# Patient Record
Sex: FEMALE | Race: BLACK OR AFRICAN AMERICAN | NOT HISPANIC OR LATINO | URBAN - NONMETROPOLITAN AREA
[De-identification: names, ages, dates, MRNs, and addresses within clinical notes are randomized per-mention and may not be internally consistent; named-entity substitution may affect disease eponyms.]

---

## 2019-03-01 ENCOUNTER — IMPORTED ENCOUNTER (OUTPATIENT)
Dept: URBAN - NONMETROPOLITAN AREA CLINIC 1 | Facility: CLINIC | Age: 59
End: 2019-03-01

## 2019-03-01 PROBLEM — H40.013: Noted: 2019-03-01

## 2019-03-01 PROBLEM — E11.9: Noted: 2019-03-01

## 2019-03-01 PROBLEM — H52.03: Noted: 2019-03-01

## 2019-03-01 PROCEDURE — 92015 DETERMINE REFRACTIVE STATE: CPT

## 2019-03-01 PROCEDURE — 92004 COMPRE OPH EXAM NEW PT 1/>: CPT

## 2019-03-01 NOTE — PATIENT DISCUSSION
Hyperopia-Discussed diagnosis with patient. Updated spec Rx given. Recommend lens that will provide comfort as well as protect safety and health of eyes. DM s -Stressed the importance of keeping blood sugars under control blood pressure under control and weight normalization and regular visits with PCP. -Explained the possible effects of poorly controlled diabetes and the damage that diabetes can cause to ocular health. -Patient to check HgbA1C.-Pt instructed to contact our office with any vision changes. Glaucoma Suspect-Based on high iop today-Appears stable at this time.-Continue to monitor with exams and testing.; Dr's Notes: pcp ? hasn't seen pcp yet

## 2020-08-11 ENCOUNTER — IMPORTED ENCOUNTER (OUTPATIENT)
Dept: URBAN - NONMETROPOLITAN AREA CLINIC 1 | Facility: CLINIC | Age: 60
End: 2020-08-11

## 2020-08-11 PROCEDURE — 92014 COMPRE OPH EXAM EST PT 1/>: CPT

## 2020-08-11 PROCEDURE — 92015 DETERMINE REFRACTIVE STATE: CPT

## 2020-08-11 PROCEDURE — 92133 CPTRZD OPH DX IMG PST SGM ON: CPT

## 2020-08-11 NOTE — PATIENT DISCUSSION
Hyperopia-Discussed diagnosis with patient. Updated spec Rx given. Recommend lens that will provide comfort as well as protect safety and health of eyes. DM s DR-Stressed the importance of keeping blood sugars under control blood pressure under control and weight normalization and regular visits with PCP. -Explained the possible effects of poorly controlled diabetes and the damage that diabetes can cause to ocular health. -Patient to check HgbA1C.-Pt instructed to contact our office with any vision changes. Glaucoma Suspect-Based on high iop today-Appears stable at this time.-Continue to monitor with exams and testing.

## 2022-04-10 ASSESSMENT — VISUAL ACUITY
OU_CC: J1+
OS_CC: 20/40
OD_SC: 20/20
OU_CC: J1
OS_SC: 20/20
OS_SC: 20/30
OD_SC: 20/25

## 2022-04-10 ASSESSMENT — TONOMETRY
OD_IOP_MMHG: 25
OS_IOP_MMHG: 19
OD_IOP_MMHG: 19
OS_IOP_MMHG: 25

## 2023-12-18 ENCOUNTER — NEW PATIENT (OUTPATIENT)
Dept: RURAL CLINIC 1 | Facility: CLINIC | Age: 63
End: 2023-12-18

## 2023-12-18 DIAGNOSIS — H52.03: ICD-10-CM

## 2023-12-18 DIAGNOSIS — H40.013: ICD-10-CM

## 2023-12-18 DIAGNOSIS — E11.9: ICD-10-CM

## 2023-12-18 PROCEDURE — 92015 DETERMINE REFRACTIVE STATE: CPT

## 2023-12-18 PROCEDURE — 92004 COMPRE OPH EXAM NEW PT 1/>: CPT

## 2023-12-18 ASSESSMENT — TONOMETRY
OD_IOP_MMHG: 19
OS_IOP_MMHG: 19

## 2023-12-18 ASSESSMENT — VISUAL ACUITY
OU_CC: 20/20
OD_CC: 20/20
OS_CC: 20/20

## 2025-01-29 ENCOUNTER — COMPREHENSIVE EXAM (OUTPATIENT)
Age: 65
End: 2025-01-29

## 2025-01-29 DIAGNOSIS — E11.9: ICD-10-CM

## 2025-01-29 DIAGNOSIS — H52.03: ICD-10-CM

## 2025-01-29 DIAGNOSIS — H40.013: ICD-10-CM

## 2025-01-29 PROCEDURE — 92014 COMPRE OPH EXAM EST PT 1/>: CPT
